# Patient Record
Sex: FEMALE | Race: WHITE
[De-identification: names, ages, dates, MRNs, and addresses within clinical notes are randomized per-mention and may not be internally consistent; named-entity substitution may affect disease eponyms.]

---
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
## 2018-08-23 ENCOUNTER — HOSPITAL ENCOUNTER (EMERGENCY)
Dept: HOSPITAL 92 - ERS | Age: 18
Discharge: HOME | End: 2018-08-23
Payer: SELF-PAY

## 2018-08-23 DIAGNOSIS — F17.210: ICD-10-CM

## 2018-08-23 DIAGNOSIS — S20.211A: Primary | ICD-10-CM

## 2018-08-23 DIAGNOSIS — R51: ICD-10-CM

## 2018-08-23 DIAGNOSIS — R73.03: ICD-10-CM

## 2018-08-23 DIAGNOSIS — F31.9: ICD-10-CM

## 2018-08-23 DIAGNOSIS — F41.9: ICD-10-CM

## 2018-08-23 DIAGNOSIS — L98.9: ICD-10-CM

## 2018-08-23 DIAGNOSIS — E03.9: ICD-10-CM
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
## 2018-08-23 PROCEDURE — 36416 COLLJ CAPILLARY BLOOD SPEC: CPT

## 2018-08-23 PROCEDURE — 70450 CT HEAD/BRAIN W/O DYE: CPT

## 2018-08-23 PROCEDURE — 93005 ELECTROCARDIOGRAM TRACING: CPT
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
## 2018-08-23 NOTE — RAD
PA CHEST X-RAY WITH TWO VIEWS RIGHT-SIDED RIBS:

 

08/23/2018

 

HISTORY:

The patient felt into a TV three days ago.  Right-sided rib pain.

 

FINDINGS:

The cardiac silhouette and pulmonary vasculature are within normal limits.  The lungs are clear.  The
re is no pleural effusion or pneumothorax visualized.  No right-sided rib fracture is visualized.  No
 other findings.

 

IMPRESSION:

1.  No acute cardiopulmonary process.

 

2.  No right-sided rib fracture visualized.

 

POS: [facility]

## 2018-08-25 NOTE — EKG
Test Reason : 

Blood Pressure : ***/*** mmHG

Vent. Rate : 091 BPM     Atrial Rate : 091 BPM

   P-R Int : 148 ms          QRS Dur : 090 ms

    QT Int : 362 ms       P-R-T Axes : -04 032 023 degrees

   QTc Int : 445 ms

 

Normal sinus rhythm

Nonspecific T wave abnormality

Abnormal ECG

 

Confirmed by MAGDALENA MULLINS DO ([id number]),  SHIRLEY MONTGOMERY ([id number]) on 8/25/2018 4:56:29 PM

 

Referred By:             Confirmed By:MAGDALENA MULLINS DO

## 2018-10-31 ENCOUNTER — HOSPITAL ENCOUNTER (EMERGENCY)
Dept: HOSPITAL 92 - ERS | Age: 18
Discharge: HOME | End: 2018-10-31
Payer: SELF-PAY

## 2018-10-31 DIAGNOSIS — E03.9: ICD-10-CM

## 2018-10-31 DIAGNOSIS — S09.90XA: ICD-10-CM

## 2018-10-31 DIAGNOSIS — F41.9: ICD-10-CM

## 2018-10-31 DIAGNOSIS — F17.210: ICD-10-CM

## 2018-10-31 DIAGNOSIS — M79.641: ICD-10-CM

## 2018-10-31 DIAGNOSIS — F31.9: ICD-10-CM
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
## 2018-10-31 DIAGNOSIS — S00.81XA: Primary | ICD-10-CM

## 2018-10-31 DIAGNOSIS — M54.2: ICD-10-CM

## 2018-10-31 PROCEDURE — 72125 CT NECK SPINE W/O DYE: CPT

## 2018-10-31 PROCEDURE — 70450 CT HEAD/BRAIN W/O DYE: CPT
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
## 2018-10-31 NOTE — RAD
THREE VIEWS OF THE RIGHT HAND:

10/31/18

 

COMPARISON:  

None.

 

HISTORY: 

Right hand pain.

 

FINDINGS:  

Three views of the right hand shows no evidence of acute fracture or dislocation. No degenerative thuy
nges are seen. No soft tissue swelling is seen. 

 

IMPRESSION:  

Unremarkable exam. 

 

POS: [facility]

## 2019-01-28 ENCOUNTER — HOSPITAL ENCOUNTER (EMERGENCY)
Dept: HOSPITAL 92 - ERS | Age: 19
Discharge: HOME | End: 2019-01-28
Payer: SELF-PAY

## 2019-01-28 DIAGNOSIS — E03.9: ICD-10-CM

## 2019-01-28 DIAGNOSIS — F41.9: ICD-10-CM

## 2019-01-28 DIAGNOSIS — Z71.6: ICD-10-CM

## 2019-01-28 DIAGNOSIS — F17.210: ICD-10-CM

## 2019-01-28 DIAGNOSIS — F31.9: ICD-10-CM

## 2019-01-28 DIAGNOSIS — J06.9: Primary | ICD-10-CM

## 2019-01-28 PROCEDURE — 87081 CULTURE SCREEN ONLY: CPT

## 2019-01-28 PROCEDURE — 99406 BEHAV CHNG SMOKING 3-10 MIN: CPT

## 2019-01-28 PROCEDURE — 87804 INFLUENZA ASSAY W/OPTIC: CPT

## 2019-01-28 PROCEDURE — 71046 X-RAY EXAM CHEST 2 VIEWS: CPT

## 2019-01-28 PROCEDURE — 87430 STREP A AG IA: CPT

## 2019-01-28 NOTE — RAD
PA AND LATERAL CHEST:

 

Indication: Cough. 

 

Comparison: 8-6-17

 

FINDINGS: 

Lungs are clear. Cardiomediastinal silhouette is within normal limits. No acute osseous abnormality i
s evident. 

 

IMPRESSION: 

No acute cardiopulmonary abnormality. 

 

[facility]: [facility]

## 2019-03-17 ENCOUNTER — HOSPITAL ENCOUNTER (EMERGENCY)
Dept: HOSPITAL 92 - ERS | Age: 19
Discharge: HOME | End: 2019-03-17
Payer: SELF-PAY

## 2019-03-17 DIAGNOSIS — R07.89: ICD-10-CM

## 2019-03-17 DIAGNOSIS — R55: Primary | ICD-10-CM

## 2019-03-17 DIAGNOSIS — R73.03: ICD-10-CM

## 2019-03-17 DIAGNOSIS — E03.9: ICD-10-CM

## 2019-03-17 DIAGNOSIS — F41.9: ICD-10-CM

## 2019-03-17 DIAGNOSIS — F31.9: ICD-10-CM

## 2019-03-17 DIAGNOSIS — F17.210: ICD-10-CM

## 2019-03-17 LAB
ALBUMIN SERPL BCG-MCNC: 4.3 G/DL (ref 3.5–5)
ALP SERPL-CCNC: 91 U/L (ref 40–150)
ALT SERPL W P-5'-P-CCNC: 34 U/L (ref 8–55)
ANION GAP SERPL CALC-SCNC: 13 MMOL/L (ref 10–20)
AST SERPL-CCNC: 31 U/L (ref 5–34)
BACTERIA UR QL AUTO: (no result) HPF
BASOPHILS # BLD AUTO: 0.1 THOU/UL (ref 0–0.2)
BASOPHILS NFR BLD AUTO: 0.8 % (ref 0–1)
BILIRUB SERPL-MCNC: 0.2 MG/DL (ref 0.2–1.2)
BUN SERPL-MCNC: 9 MG/DL (ref 7–18.7)
CALCIUM SERPL-MCNC: 9.7 MG/DL (ref 7.8–10.44)
CHLORIDE SERPL-SCNC: 104 MMOL/L (ref 98–107)
CO2 SERPL-SCNC: 27 MMOL/L (ref 22–29)
CREAT CL PREDICTED SERPL C-G-VRATE: 0 ML/MIN (ref 70–130)
CRYSTAL-AUWI FLAG: 0 (ref 0–15)
EOSINOPHIL # BLD AUTO: 0.1 THOU/UL (ref 0–0.7)
EOSINOPHIL NFR BLD AUTO: 1.1 % (ref 0–10)
GLOBULIN SER CALC-MCNC: 3.3 G/DL (ref 2.4–3.5)
GLUCOSE SERPL-MCNC: 78 MG/DL (ref 70–105)
HEV IGM SER QL: 0.1 (ref 0–7.99)
HGB BLD-MCNC: 13.3 G/DL (ref 12–16)
HYALINE CASTS #/AREA URNS LPF: (no result) LPF
LYMPHOCYTES # BLD: 2.3 THOU/UL (ref 1.2–3.4)
LYMPHOCYTES NFR BLD AUTO: 28.6 % (ref 28–48)
MCH RBC QN AUTO: 27.8 PG (ref 25–35)
MCV RBC AUTO: 85 FL (ref 78–98)
MONOCYTES # BLD AUTO: 0.4 THOU/UL (ref 0.11–0.59)
MONOCYTES NFR BLD AUTO: 4.5 % (ref 0–4)
NEUTROPHILS # BLD AUTO: 5.3 THOU/UL (ref 1.4–6.5)
NEUTROPHILS NFR BLD AUTO: 65 % (ref 31–61)
PATHC CAST-AUWI FLAG: 2.58 (ref 0–2.49)
PLATELET # BLD AUTO: 348 THOU/UL (ref 130–400)
POTASSIUM SERPL-SCNC: 3.9 MMOL/L (ref 3.5–5.1)
PREGU CONTROL BACKGROUND?: (no result)
PREGU CONTROL BAR APPEAR?: YES
RBC # BLD AUTO: 4.79 MILL/UL (ref 4–5.2)
RBC UR QL AUTO: (no result) HPF (ref 0–3)
SODIUM SERPL-SCNC: 140 MMOL/L (ref 136–145)
SP GR UR STRIP: 1.02 (ref 1–1.04)
SPERM-AUWI FLAG: 0 (ref 0–9.9)
WBC # BLD AUTO: 8.1 THOU/UL (ref 4.8–10.8)
WBC UR QL AUTO: (no result) HPF (ref 0–3)
YEAST-AUWI FLAG: 0 (ref 0–25)

## 2019-03-17 PROCEDURE — 80053 COMPREHEN METABOLIC PANEL: CPT

## 2019-03-17 PROCEDURE — 71045 X-RAY EXAM CHEST 1 VIEW: CPT

## 2019-03-17 PROCEDURE — 81003 URINALYSIS AUTO W/O SCOPE: CPT

## 2019-03-17 PROCEDURE — 93005 ELECTROCARDIOGRAM TRACING: CPT

## 2019-03-17 PROCEDURE — 81015 MICROSCOPIC EXAM OF URINE: CPT

## 2019-03-17 PROCEDURE — 81025 URINE PREGNANCY TEST: CPT

## 2019-03-17 PROCEDURE — 36415 COLL VENOUS BLD VENIPUNCTURE: CPT

## 2019-03-17 PROCEDURE — 85025 COMPLETE CBC W/AUTO DIFF WBC: CPT

## 2019-03-17 PROCEDURE — 96360 HYDRATION IV INFUSION INIT: CPT

## 2019-03-17 NOTE — RAD
AP CHEST:

 

History: Passed out a work. 

 

Date: 3-17-19 

 

Comparison: 9-3-17

 

FINDINGS: 

AP chest demonstrates the lungs to be well aerated. No evidence of active intrathoracic disease seen.
 No evidence of effusions, pneumonia, or pneumothorax seen. 

 

IMPRESSION: 

Unremarkable AP chest.

 

POS: [facility]

## 2019-04-22 ENCOUNTER — HOSPITAL ENCOUNTER (EMERGENCY)
Dept: HOSPITAL 92 - ERS | Age: 19
Discharge: HOME | End: 2019-04-22
Payer: SELF-PAY

## 2019-04-22 DIAGNOSIS — E03.9: ICD-10-CM

## 2019-04-22 DIAGNOSIS — F41.9: ICD-10-CM

## 2019-04-22 DIAGNOSIS — F31.9: ICD-10-CM

## 2019-04-22 DIAGNOSIS — L05.01: Primary | ICD-10-CM

## 2019-04-22 DIAGNOSIS — F17.210: ICD-10-CM

## 2019-04-22 PROCEDURE — 99281 EMR DPT VST MAYX REQ PHY/QHP: CPT

## 2019-07-08 ENCOUNTER — HOSPITAL ENCOUNTER (EMERGENCY)
Dept: HOSPITAL 92 - ERS | Age: 19
Discharge: HOME | End: 2019-07-08
Payer: SELF-PAY

## 2019-07-08 DIAGNOSIS — F31.9: ICD-10-CM

## 2019-07-08 DIAGNOSIS — E11.9: ICD-10-CM

## 2019-07-08 DIAGNOSIS — E03.9: ICD-10-CM

## 2019-07-08 DIAGNOSIS — R20.2: Primary | ICD-10-CM

## 2019-07-08 DIAGNOSIS — F41.9: ICD-10-CM

## 2019-07-08 PROCEDURE — 93005 ELECTROCARDIOGRAM TRACING: CPT

## 2019-11-24 ENCOUNTER — HOSPITAL ENCOUNTER (EMERGENCY)
Dept: HOSPITAL 92 - ERS | Age: 19
Discharge: HOME | End: 2019-11-24
Payer: SELF-PAY

## 2019-11-24 DIAGNOSIS — Z87.891: ICD-10-CM

## 2019-11-24 DIAGNOSIS — J06.9: Primary | ICD-10-CM

## 2019-11-24 PROCEDURE — 99283 EMERGENCY DEPT VISIT LOW MDM: CPT

## 2020-02-18 ENCOUNTER — HOSPITAL ENCOUNTER (EMERGENCY)
Dept: HOSPITAL 92 - ERS | Age: 20
Discharge: HOME | End: 2020-02-18
Payer: SELF-PAY

## 2020-02-18 DIAGNOSIS — E03.9: ICD-10-CM

## 2020-02-18 DIAGNOSIS — F41.9: ICD-10-CM

## 2020-02-18 DIAGNOSIS — S06.0X9A: Primary | ICD-10-CM

## 2020-02-18 DIAGNOSIS — F17.200: ICD-10-CM
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
## 2020-02-18 DIAGNOSIS — R73.03: ICD-10-CM

## 2020-02-18 DIAGNOSIS — F31.9: ICD-10-CM

## 2020-02-18 LAB
ALBUMIN SERPL BCG-MCNC: 4.3 G/DL (ref 3.5–5)
ALP SERPL-CCNC: 94 U/L (ref 40–110)
ALT SERPL W P-5'-P-CCNC: 25 U/L (ref 8–55)
ANION GAP SERPL CALC-SCNC: 12 MMOL/L (ref 10–20)
AST SERPL-CCNC: 20 U/L (ref 5–34)
BASOPHILS # BLD AUTO: 0.1 THOU/UL (ref 0–0.2)
BASOPHILS NFR BLD AUTO: 0.9 % (ref 0–1)
BILIRUB SERPL-MCNC: 0.3 MG/DL (ref 0.2–1.2)
BUN SERPL-MCNC: 9 MG/DL (ref 7–18.7)
CALCIUM SERPL-MCNC: 9.3 MG/DL (ref 7.8–10.44)
CHLORIDE SERPL-SCNC: 108 MMOL/L (ref 98–107)
CO2 SERPL-SCNC: 24 MMOL/L (ref 22–29)
CREAT CL PREDICTED SERPL C-G-VRATE: 0 ML/MIN (ref 70–130)
EOSINOPHIL # BLD AUTO: 0.1 THOU/UL (ref 0–0.7)
EOSINOPHIL NFR BLD AUTO: 1.3 % (ref 0–10)
GLOBULIN SER CALC-MCNC: 3.3 G/DL (ref 2.4–3.5)
GLUCOSE SERPL-MCNC: 116 MG/DL (ref 70–105)
HGB BLD-MCNC: 13.9 G/DL (ref 12–16)
LYMPHOCYTES # BLD: 2.6 THOU/UL (ref 1.2–3.4)
LYMPHOCYTES NFR BLD AUTO: 37 % (ref 21–51)
MCH RBC QN AUTO: 29.4 PG (ref 27–31)
MCV RBC AUTO: 83.8 FL (ref 78–98)
MONOCYTES # BLD AUTO: 0.4 THOU/UL (ref 0.11–0.59)
MONOCYTES NFR BLD AUTO: 5.6 % (ref 0–10)
NEUTROPHILS # BLD AUTO: 3.8 THOU/UL (ref 1.4–6.5)
NEUTROPHILS NFR BLD AUTO: 55.2 % (ref 42–75)
PLATELET # BLD AUTO: 341 THOU/UL (ref 130–400)
POTASSIUM SERPL-SCNC: 4.1 MMOL/L (ref 3.5–5.1)
RBC # BLD AUTO: 4.73 MILL/UL (ref 4.2–5.4)
SODIUM SERPL-SCNC: 140 MMOL/L (ref 136–145)
WBC # BLD AUTO: 7 THOU/UL (ref 4.8–10.8)

## 2020-02-18 PROCEDURE — 93005 ELECTROCARDIOGRAM TRACING: CPT

## 2020-02-18 PROCEDURE — 72125 CT NECK SPINE W/O DYE: CPT

## 2020-02-18 PROCEDURE — 85025 COMPLETE CBC W/AUTO DIFF WBC: CPT

## 2020-02-18 PROCEDURE — L0120 CERV FLEX N/ADJ FOAM PRE OTS: HCPCS

## 2020-02-18 PROCEDURE — 80053 COMPREHEN METABOLIC PANEL: CPT

## 2020-02-18 PROCEDURE — 96374 THER/PROPH/DIAG INJ IV PUSH: CPT

## 2020-02-18 PROCEDURE — 70450 CT HEAD/BRAIN W/O DYE: CPT

## 2020-02-18 PROCEDURE — 96361 HYDRATE IV INFUSION ADD-ON: CPT
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
## 2020-02-18 NOTE — RAD
RIGHT KNEE 4 VIEWS:

 

HISTORY: 

Fall.  Pain.

 

FINDINGS: 

No joint effusion.  No fracture.  No malalignment.  Joint spaces are preserved.

 

IMPRESSION: 

Unremarkable 4 views right knee.

 

POS: [facility]